# Patient Record
Sex: MALE | Employment: OTHER | URBAN - METROPOLITAN AREA
[De-identification: names, ages, dates, MRNs, and addresses within clinical notes are randomized per-mention and may not be internally consistent; named-entity substitution may affect disease eponyms.]

---

## 2017-01-05 ENCOUNTER — HOSPITAL ENCOUNTER (OUTPATIENT)
Dept: PHYSICAL THERAPY | Age: 62
Discharge: HOME | End: 2017-01-05
Payer: OTHER GOVERNMENT

## 2017-01-05 PROCEDURE — 97110 THERAPEUTIC EXERCISES: CPT

## 2017-01-05 PROCEDURE — 97140 MANUAL THERAPY 1/> REGIONS: CPT

## 2017-01-05 NOTE — PROGRESS NOTES
PF DAILY TREATMENT NOTE 3-16    Patient Name: Los Wyatt  Date:2017  : 1955  [x]  Patient  Verified  Payor:  / Plan: [facility]  RETIREES AND DEPENDENTS / Product Type: Nazanin Shames /    In time:1105  Out time:1200  Total Treatment Time (min): 55  Total Timed Codes (min): 55  1:1 Treatment Time ( [address] only):     Visit #: 2 of 8    Treatment Area: [] Pelvic Floor     [] Other:    SUBJECTIVE  Pain Level (0-10 scale): 4/10  Any medication changes, allergies to medications, adverse drug reactions, diagnosis change, or new procedure performed?: [x] No    [] Yes (see summary sheet for update)  Subjective functional status/changes:   [] No changes reported  Pt reports that he wakes about 2-3 times a night. He states that he has had pelvic pain, feeling it in his testicle(s) and post ejaculation. OBJECTIVE      30 min Therapeutic Exercise:  [x] See flow sheet :   []  Pelvic floor strengthening                 []  Pelvic floor downtraining  []  Quality pelvic floor contractions       [x]  Relaxation techniques  []  Urge suppression exercises  [x]  Other:added groin stretches  Rationale: increase ROM and increase strength  to improve the patients ability to complete functional activities without pain       15 min Manual Therapy:  MET to correct left posterior innominate rotation   Rationale: decrease pain and increase ROM to complete          With   [] TE   [] TA   [] neuro  [] manual   [] other: Patient Education: [x] Review HEP    [] Progressed/Changed HEP based on:   [] positioning   [] body mechanics   [] transfers   [] heat/ice application    [] other:    Pain Level (0-10 scale) post treatment: 0/10    ASSESSMENT/Changes in Function: Pt demonstrates limited hip mobility contributing to reported pelvic pain. Pt additionally demonstrated a hip obliquity contributing to pelvic floor dysfunction.   Pt would benefit from addition of pelvic pain goals    []  Decrease # of leaks   [] No change [] Improving [] Resolved     []  Decrease hypertonus [] No change []  Improving [] Resolved     []  Increase void interval [] No change []  Improving [] Resolved     []  Increase PF strength [] No change []  Improving [] Resolved     []  Increase PF endurance [] No change []  Improving [] Resolved     []  Increase endurance [] No change []  Improving [] Resolved     []  Decrease # of pads [] No change []  Improving [] Resolved     [x]  Decrease pain [] No change [x]  Improving [] Resolved     []  Increased coordination [] No change []  Improving [] Resolved     []  Increased Bowel Frequency [] No change []  Improving [] Resolved       Patient will continue to benefit from skilled PT services to address ROM deficits, address strength deficits and analyze and address soft tissue restrictions to attain remaining goals. []  See Plan of Care  []  See progress note/recertification  []  See Discharge Summary         Progress towards goals / Updated goals:  Short Term Goals: To be accomplished in 4 weeks:  1. Patient will demonstrate accurate performance of home exercise program/pelvic floor contractions as adjunct to physical therapy clinic visits to promote healthy lifestyle and increased quality of life. Status at Eval:Instructed in HEP to address deficits  Current: pt reports compliance  2. Patient will Complete Bladder Diary for use in patient education and goal setting as indicated. Status at Eval: reports urinary frequency when sleep after work shift (see above)  3. Patient will have sleep time void frequency decreased to <2-3x for increased quality of life. Status at Eval:3-5x/night follow shift work  Current: Slight improvement, 3-4 times a night currently     Long Term Goals: To be accomplished in 8 weeks   1. Patient will demonstrate independence in pelvic floor home exercise program for maintenance of pelvic floor program and increased quality of life. Status at Eval:pt given Handout for use in HEP  2. Patient will have sleep time void frequency decreased to <1x for improved sleep and increased quality of life. Status at Eval:3-5x while sleeping  Current: Progressing, see above  3. Patient will have FOTO score Urinary Problem change 8 points or more indicating improvement in function. Status at Eval:FOTO score Urinary Problem 86  Current:  Will assess at next visit    PLAN  []  Upgrade activities as tolerated     []  Continue plan of care  []  Update interventions per flow sheet       []  Discharge due to:_  []  Other:_      Hernán Waggoner PTA 1/5/2017  1:34 PM    Future Appointments  Date Time Provider Luis Antonio Angelo   1/9/2017 8:30 AM Izabella Vang, PT [facility]   1/16/2017 8:00 AM Izabella Vang, PT [facility]

## 2017-01-16 ENCOUNTER — HOSPITAL ENCOUNTER (OUTPATIENT)
Dept: PHYSICAL THERAPY | Age: 62
Discharge: HOME | End: 2017-01-16
Payer: OTHER GOVERNMENT

## 2017-01-16 PROCEDURE — 97140 MANUAL THERAPY 1/> REGIONS: CPT

## 2017-01-16 PROCEDURE — 97164 PT RE-EVAL EST PLAN CARE: CPT

## 2017-01-16 PROCEDURE — 97110 THERAPEUTIC EXERCISES: CPT

## 2017-01-16 NOTE — PROGRESS NOTES
[facility]  [address] [place], [address] [facility]  Ph [phone]  Fx [phone]    Pelvic Floor Progress Note  Patient name: Dakota Espana Start of Care: 16   Referral source: Kole Monreal MD : 1955   Medical/Treatment Diagnosis: Neuromuscular dysfunction of bladder, unspecified [N31.9] Onset Date:     Prior Hospitalization: see medical history Provider#: [id number]   Medications: Verified on Patient Summary List    Comorbidities: Pacemaker, Mitral valve repair Oct 2014, GERD, Vasectomy  (tubes removed in )  Prior Level of Function:h/o prostatitis after Vasectomy, but resolved following tube removal in   Visits from Start of Care: 3    Missed Visits: 1    Established Goals:           Excellent Good         Limited           None  [] Increase Pelvic MM strength       []  []  []  []  [] Decrease Pelvic MM hypertonus     []  []  []  []  [] Decrease Incontinence Episodes    []  []  []  []   [x] Improve Voiding Habits        []  [x]  []  []   [] Decreased Urgency        []  []  []  []  [] Decrease Pelvic Pain        []  []  []  []    Key Functional Changes: decrease in frequency when sleeping decreased from 3-5x/night to 1-3x/night  Progress towards goals / Updated goals:  Short Term Goals: To be accomplished in 4 weeks:  1. Patient will demonstrate accurate performance of home exercise program/pelvic floor contractions as adjunct to physical therapy clinic visits to promote healthy lifestyle and increased quality of life. Status at Eval:Instructed in HEP to address deficits  Current: Met demonstrates accuracy and compliance  2. Patient will Complete Bladder Diary for use in patient education and goal setting as indicated. Status at Eval: reports urinary frequency when sleep after work shift (see above)  Current status: Pt has completed same before UDS and will complete same again this week  3.  Patient will have sleep time void frequency decreased to <2-3x for increased quality of life. Status at Eval:3-5x/night follow shift work  Current:  Met at 1-3x/night now  [address] Term Goals: To be accomplished in 8 weeks   1. Patient will demonstrate independence in pelvic floor home exercise program for maintenance of pelvic floor program and increased quality of life. Status at Eval:pt given Handout for use in HEP  Current Status: progressing with independence  2. Patient will have sleep time void frequency decreased to <1x for improved sleep and increased quality of life. Status at Eval:3-5x while sleeping  Current: Progressing, see above  3. Patient will have FOTO score Urinary Problem change 8 points or more indicating improvement in function. Status at Eval:FOTO score Urinary Problem 86  Current: Met: FOTO Urinary Problem score 98 for 12 points improvement    Updated Goals: to be achieved in 4 weeks:  1. Patient will report worst scrotal pain 1-2/10 or less for improved quality of life and more normal function. 2.  Patient will Complete Bladder Diary for use in patient education and goal setting as indicated. 3.  Patient will demonstrate independence in pelvic floor home exercise program for maintenance of pelvic floor program and increased quality of life. 4.  Patient will have sleep time void frequency decreased to 1-2 x for improved sleep and increased quality of life. ASSESSMENT/RECOMMENDATIONS: Pt reports h/o scrotal pain, not initially reported at evaluation. Pt reports pain scrotal pain since 2001 following a vasectomy. States tubes were getting inflamed and were subsequently removed in 2004. Pain decreased overall afterward \"tube removal\", but would occur after intercourse and pt has been abstinent for some time now due to same. Pt now reports same pain present, though not as intense. At worst in last 3 wks at worst 3-4/10 and at best 1/10.   Pt has also demonstrated limited hip mobility contributing to reported pelvic pain, as well as right pelvic obliquity contributing to pelvic floor dysfunction. Pt would benefit from addition of pelvic floor physical therapy to address pain at this time. Urinary symptoms have improved as noted above. [x]Continue therapy per initial plan/protocol at a frequency of  1 x per week for 4 weeks  []Continue therapy with the following recommended changes:_____________________      _____________________________________________________________________  []Discontinue therapy progressing towards or have reached established goals  []Discontinue therapy due to lack of appreciable progress towards goals  []Discontinue therapy due to lack of attendance or compliance  []Await Physician's recommendations/decisions regarding therapy  []Other:________________________________________________________________    Thank you for this referral.   Phyllis Davis, PT 1/16/2017 8:10 AM  NOTE TO PHYSICIAN:  PLEASE COMPLETE THE ORDERS BELOW AND   FAX TO [facility]: [phone]  If you are unable to process this request in 24 hours please contact our office: [phone]      ? I have read the above report and request that my patient continue as recommended. ? I have read the above report and request that my patient continue therapy with the following changes/special instructions:___________________________________________________________  ? I have read the above report and request that my patient be discharged from therapy.     Physicians signature: ________________________________Date: _____Time:_____

## 2017-01-16 NOTE — PROGRESS NOTES
PF DAILY TREATMENT NOTE 3-16    Patient Name: Mendoza Kaminski  Date:2017  : 1955  [x]  Patient  Verified  Payor:  / Plan: [facility]  RETIREES AND DEPENDENTS / Product Type: Kailey Stack /    In time:8:06  Out time:8:53  Total Treatment Time (min):47  Visit #: 3 of 8    Treatment Area: [x] Pelvic Floor     [] Other:    SUBJECTIVE  Pain Level (0-10 scale): 1/10 in scrotum  Any medication changes, allergies to medications, adverse drug reactions, diagnosis change, or new procedure performed?: [x] No    [] Yes (see summary sheet for update)  Subjective functional status/changes:   [] No changes reported  Doing better, waking up only 2-3 times a night and did not wake up at all last night. OBJECTIVE      25 min Therapeutic Exercise:  [] See flow sheet :   []  Pelvic floor strengthening                 []  Pelvic floor downtraining  []  Quality pelvic floor contractions       [x]  Relaxation techniques  []  Urge suppression exercises  [x]  Other: and demonstrates accurate self MET  Rationale: increase ROM and decrease pain  to improve the patients ability to perform functional activity without pain       10 min Manual Therapy:  DPM bilat superior hip adductor   Rationale: decrease pain and increase tissue extensibility to decrease scrotal     With   [] TE   [] TA   [] neuro  [] manual   [] other: Patient Education: [x] Review HEP    [] Progressed/Changed HEP based on:   [] positioning   [] body mechanics   [] transfers   [] heat/ice application    [] other:      Other Objective/Functional Measures: Re-Eval: palp: TTP of bilat hip adductors more superiorly.    Pelvic alignment:   []baseline resting tone:   []slow twitch mms   []fast twitch mms    Pain Level (0-10 scale) post treatment: 3/10    ASSESSMENT/Changes in Function: See POC    []  Decrease # of leaks   [] No change []  Improving [] Resolved     []  Decrease hypertonus [] No change []  Improving [] Resolved     [x]  Increase void interval [] No change [x]  Improving [] Resolved     []  Increase PF strength [] No change []  Improving [] Resolved     []  Increase PF endurance [] No change []  Improving [] Resolved     []  Increase endurance [] No change []  Improving [] Resolved     []  Decrease # of pads [] No change []  Improving [] Resolved     [x]  Decrease pain [] No change [x]  Improving [] Resolved     []  Increased coordination [] No change []  Improving [] Resolved     []  Increased Bowel Frequency [] No change []  Improving [] Resolved       Patient will continue to benefit from skilled PT services to modify and progress therapeutic interventions, address ROM deficits and analyze and address soft tissue restrictions to attain remaining goals. []  See Plan of Care  [x]  See progress note/recertification  []  See Discharge Summary    Progress towards goals/Updated goals:      Short Term Goals: To be accomplished in 4 weeks:  1. Patient will demonstrate accurate performance of home exercise program/pelvic floor contractions as adjunct to physical therapy clinic visits to promote healthy lifestyle and increased quality of life. Status at Eval:Instructed in HEP to address deficits  Current: Met demonstrates accuracy and compliance  2. Patient will Complete Bladder Diary for use in patient education and goal setting as indicated. Status at Eval: reports urinary frequency when sleep after work shift (see above)  Current status: Pt has completed same before UDS and will complete same again this week  3. Patient will have sleep time void frequency decreased to <2-3x for increased quality of life. Status at Eval:3-5x/night follow shift work  Current: Met at 1-3x/night now  [address] Term Goals: To be accomplished in 8 weeks   1. Patient will demonstrate independence in pelvic floor home exercise program for maintenance of pelvic floor program and increased quality of life.    Status at Eval:pt given Handout for use in HEP  Current Status: progressing with independence  2. Patient will have sleep time void frequency decreased to <1x for improved sleep and increased quality of life. Status at Eval:3-5x while sleeping  Current: Progressing, see above  3. Patient will have FOTO score Urinary Problem change 8 points or more indicating improvement in function. Status at Eval:FOTO score Urinary Problem 86  Current: Met: FOTO Urinary Problem score 98 for 12 points improvement       PLAN  []  Upgrade activities as tolerated     [x]  Continue plan of care  []  Update interventions per flow sheet       []  Discharge due to:_  []  Other:_      Bautitsa Klein, PT 1/16/2017  8:06 AM    No future appointments.

## 2017-01-23 ENCOUNTER — APPOINTMENT (OUTPATIENT)
Dept: PHYSICAL THERAPY | Age: 62
End: 2017-01-23
Payer: OTHER GOVERNMENT

## 2017-02-03 ENCOUNTER — APPOINTMENT (OUTPATIENT)
Dept: PHYSICAL THERAPY | Age: 62
End: 2017-02-03
Payer: OTHER GOVERNMENT

## 2017-02-10 ENCOUNTER — HOSPITAL ENCOUNTER (OUTPATIENT)
Dept: PHYSICAL THERAPY | Age: 62
Discharge: HOME | End: 2017-02-10
Payer: OTHER GOVERNMENT

## 2017-02-10 PROCEDURE — 97110 THERAPEUTIC EXERCISES: CPT

## 2017-02-10 NOTE — PROGRESS NOTES
PF DAILY TREATMENT NOTE 3-16    Patient Name: Catracho Corcoran  Date:2/10/2017  : 1955  [x]  Patient  Verified  Payor:  / Plan: [facility]  RETIREES AND DEPENDENTS / Product Type: Darleen Balloon /    In time:900  Out time:930  Total Treatment Time (min): 30  Total Timed Codes (min): 30  1:1 Treatment Time ( [address] only):     Visit #: 4 of 8    Treatment Area: [] Pelvic Floor     [] Other:    SUBJECTIVE  Pain Level (0-10 scale): 0/10  Any medication changes, allergies to medications, adverse drug reactions, diagnosis change, or new procedure performed?: [x] No    [] Yes (see summary sheet for update)  Subjective functional status/changes:   [] No changes reported  Pt reports that he has been sleeping better waking about 2-3 times a night. He states that his pain has been better if he is consistent with his HEP but it is higher when he is with his fiancee as they are abstaining until marriage. OBJECTIVE             30 min Therapeutic Exercise:  [x] See flow sheet :   []  Pelvic floor strengthening                 [x]  Pelvic floor downtraining  []  Quality pelvic floor contractions       [x]  Relaxation techniques  []  Urge suppression exercises  [x]  Other: hip stretches  Rationale: increase ROM  to improve the patients ability to complete functional activities absent of pain               With   [x] TE   [] TA   [] neuro  [] manual   [] other: Patient Education: [x] Review HEP    [x] Progressed/Changed HEP based on:hip stretches   [] positioning   [] body mechanics   [] transfers   [] heat/ice application    [] other:        Pain Level (0-10 scale) post treatment: 0/10    ASSESSMENT/Changes in Function: Pt demonstrates very limited mobility into hamstrings, adductors and ITB bilaterally likely contributing to ongoing intermittent pain.  Pt returned demonstration of HEP to promote PF relaxation    []  Decrease # of leaks   [] No change []  Improving [] Resolved     [x]  Decrease hypertonus [] No change [x] Improving [] Resolved     []  Increase void interval [] No change []  Improving [] Resolved     []  Increase PF strength [] No change []  Improving [] Resolved     []  Increase PF endurance [] No change []  Improving [] Resolved     []  Increase endurance [] No change []  Improving [] Resolved     []  Decrease # of pads [] No change []  Improving [] Resolved     [x]  Decrease pain [] No change [x]  Improving [] Resolved     []  Increased coordination [] No change []  Improving [] Resolved     []  Increased Bowel Frequency [] No change []  Improving [] Resolved       Patient will continue to benefit from skilled PT services to address functional mobility deficits, address ROM deficits, address strength deficits and analyze and address soft tissue restrictions to attain remaining goals. []  See Plan of Care  []  See progress note/recertification  []  See Discharge Summary         Progress towards goals / Updated goals:  Short Term Goals: To be accomplished in 4 weeks:  1. Patient will demonstrate accurate performance of home exercise program/pelvic floor contractions as adjunct to physical therapy clinic visits to promote healthy lifestyle and increased quality of life. Status at Eval:Instructed in HEP to address deficits  Current: Met demonstrates accuracy and compliance  2. Patient will Complete Bladder Diary for use in patient education and goal setting as indicated. Status at Eval: reports urinary frequency when sleep after work shift (see above)  Current status: Completed and reviewed with patient  3. Patient will have sleep time void frequency decreased to <2-3x for increased quality of life. Status at Eval:3-5x/night follow shift work  Current: Met at 1-3x/night now  [address] [facility] Term Goals: To be accomplished in 8 weeks   1. Patient will demonstrate independence in pelvic floor home exercise program for maintenance of pelvic floor program and increased quality of life.    Status at Eval:pt given Handout for use in HEP  Current Status: progressing with independence  2. Patient will have sleep time void frequency decreased to <1x for improved sleep and increased quality of life. Status at Eval:3-5x while sleeping  Current: Progressing, 2-3 times a night  3. Patient will have FOTO score Urinary Problem change 8 points or more indicating improvement in function. Status at Eval:FOTO score Urinary Problem 86  Current: Met: FOTO Urinary Problem score 98 for 12 points improvement     Updated Goals: to be achieved in 4 weeks:  1. Patient will report worst scrotal pain 1-2/10 or less for improved quality of life and more normal function. Current: pt reports 5-6/10 for pain intermittently but better when he stretches regularly. 2. Patient will Complete Bladder Diary for use in patient education and goal setting as indicated. Current: Met  3. Patient will demonstrate independence in pelvic floor home exercise program for maintenance of pelvic floor program and increased quality of life. Current: Improving  4. Patient will have sleep time void frequency decreased to 1-2 x for improved sleep and increased quality of life.    Current: wakes 2-3 times a night, which has been better       PLAN  []  Upgrade activities as tolerated     []  Continue plan of care  []  Update interventions per flow sheet       []  Discharge due to:_  []  Other:_      Mike Greer PTA 2/10/2017  9:21 AM    Future Appointments  Date Time Provider Luis Antonio Angelo   2/13/2017 8:30 AM Bautista Klein PT [facility]

## 2017-02-13 ENCOUNTER — HOSPITAL ENCOUNTER (OUTPATIENT)
Dept: PHYSICAL THERAPY | Age: 62
Discharge: HOME | End: 2017-02-13
Payer: OTHER GOVERNMENT

## 2017-02-13 PROCEDURE — 97530 THERAPEUTIC ACTIVITIES: CPT

## 2017-02-13 PROCEDURE — 97110 THERAPEUTIC EXERCISES: CPT

## 2017-02-13 NOTE — PROGRESS NOTES
PF DAILY TREATMENT NOTE 3-16    Patient Name: Andria Aldana  Date:2017  : 1955  [x]  Patient  Verified  Payor:  / Plan: [facility]  RETIREES AND DEPENDENTS / Product Type: Magui Barrs /    In time:8:35  Out time:925  Total Treatment Time (min): 50  Visit #: 5of 8    Treatment Area: [x] Pelvic Floor     [] Other:    SUBJECTIVE  Pain Level (0-10 scale): 1-2/10  Any medication changes, allergies to medications, adverse drug reactions, diagnosis change, or new procedure performed?: [x] No    [] Yes (see summary sheet for update)  Subjective functional status/changes:   [] No changes reported  Key Functional Changes: decrease in scrotal pain. Pt states sleep is the most important thing for him at this point and decrease of pain. States that is why he is retiring in April.       OBJECTIVE    20 min Therapeutic Exercise:  [x] See flow sheet :   []  Pelvic floor strengthening                 [x]  Pelvic floor downtraining  []  Quality pelvic floor contractions       [x]  Relaxation techniques  []  Urge suppression exercises  []  Other:  Rationale: increase ROM  to improve the patients ability to to complete functional activities without pain      20 min Therapeutic Activity:  []  See flow sheet :    []  Increase Tissue extensibility        []  Assess fiber intake    [x]  Assess voiding habits  []  Assess bowel habits  []  Other:   Rationale: determine habits for intake/voiding and possible effect on voiding function to improve the patients ability to void without urinary frequency when sleeping          With   [] TE   [] TA   [] neuro  [] manual   [] other: Patient Education: [x] Review HEP    [] Progressed/Changed HEP based on:   [] positioning   [] body mechanics   [] transfers   [] heat/ice application    [] other:      Other Objective/Functional Measures: Hamstring flexibility R:23; L 13       []baseline resting tone:   []slow twitch mms   []fast twitch mms    Pain Level (0-10 scale) post treatment: 1-2/10    ASSESSMENT/Changes in Function: pt with improvement in pain and urinary symptoms. May continue to progress otherwise as pt becomes more compliant consistently with behavior modifications with regard to fluid intake when working. Exercises beneficial.  Pt unable to address pain following intercourse at this time as he and fiance are abstaining until marriage in [de-identified]. Recommend d/c to home program at this time     []  Decrease # of leaks   [] No change []  Improving [] Resolved     []  Decrease hypertonus [] No change []  Improving [] Resolved     []  Increase void interval [] No change []  Improving [] Resolved     []  Increase PF strength [] No change []  Improving [] Resolved     []  Increase PF endurance [] No change []  Improving [] Resolved     []  Increase endurance [] No change []  Improving [] Resolved     []  Decrease # of pads [] No change []  Improving [] Resolved     []  Decrease pain [] No change []  Improving [] Resolved     []  Increased coordination [] No change []  Improving [] Resolved     []  Increased Bowel Frequency [] No change []  Improving [] Resolved       []  See Plan of Care  []  See progress note/recertification  [x]  See Discharge Summary         Progress towards goals / Updated goals:  1. Patient will report worst scrotal pain 1-2/10 or less for improved quality of life and more normal function. Current: MET pt reports worst pain 1-2/10 for pain intermittently but better when he stretches regularly. 2. Patient will Complete Bladder Diary for use in patient education and goal setting as indicated. Current: Met  3. Patient will demonstrate independence in pelvic floor home exercise program for maintenance of pelvic floor program and increased quality of life. Current:   4. Patient will have sleep time void frequency decreased to 1-2 x for improved sleep and increased quality of life.    Current: Met Progressed-wakes 2-3 times a night, which has been better    PLAN  []  Upgrade activities as tolerated     []  Continue plan of care  []  Update interventions per flow sheet       [x]  Discharge due to:HEP and pt to f/u with Dr. Fahad Seaman (has to make appointment). []  Other:_      Rossana Mccormick, PT 2/13/2017  8:51 AM    No future appointments.

## 2017-02-13 NOTE — PROGRESS NOTES
[facility]  [address], [address] [facility] [place]  Ph [phone]  Fx [phone]    Pelvic Floor Progress Note  Patient name: Fernando Elmore Start of Care: 12/15/16   Referral source: Joan Canas MD : [id number]   Medical/Treatment Diagnosis: Neuromuscular dysfunction of bladder, unspecified [N31.9] Onset Date:     Prior Hospitalization: see medical history Provider#: [id number]   Medications: Verified on Patient Summary List    Comorbidities: Pacemaker, Mitral valve repair Oct 2014, GERD, Vasectomy  (tubes removed in )  Prior Level of Function:h/o prostatitis after Vasectomy, but resolved following tube removal in     Visits from Start of Care: 5   Missed Visits:     Established Goals:           Excellent Good         Limited           None  [] Increase Pelvic MM strength       []  []  []  []  [] Decrease Pelvic MM hypertonus     []  []  []  []  [] Decrease Incontinence Episodes    []  []  []  []   [x] Improve Voiding Habits        []  [x]  []  []   [] Decreased Urgency        []  []  []  []  [x] Decrease Pelvic Pain        []  [x]  []  []    Key Functional Changes: decrease in scrotal pain  Updated Goals: to be achieved in 4 weeks:  1. Patient will report worst scrotal pain 1-2/10 or less for improved quality of life and more normal function. Current: MET pt reports worst pain 1-2/10 for pain intermittently but better when he stretches regularly. 2. Patient will Complete Bladder Diary for use in patient education and goal setting as indicated. Current: Met  3. Patient will demonstrate independence in pelvic floor home exercise program for maintenance of pelvic floor program and increased quality of life. Current: Improving  4. Patient will have sleep time void frequency decreased to 1-2 x for improved sleep and increased quality of life.    Current: Progressed-wakes 2-3 times a night, which has been better     ASSESSMENT/RECOMMENDATIONS: Pt with improvement in pain and urinary symptoms. Pt reports feeling 30% improvement overall. States scrotal pain associated with vasectomy has been present since the 90's and following intercourse since the 2000's. Pain now at best 0/10, at worst 1-2/10. Pt unable to address pain following intercourse at this time as he and fiance are abstaining until marriage in [de-identified]. Pt states BLE stretches (hamstrings/hip adductors and ITB) have been helpful. May continue to progress otherwise with regard to urinary symptoms as pt becomes more compliant consistently with behavior modifications (fluid intake when working). Pt has noted ability to sleep with 1 episode of nocturnal voiding when off work. At this time recommend:    []Continue therapy per initial plan/protocol at a frequency of   x per week for  weeks  []Continue therapy with the following recommended changes:_____________________      _____________________________________________________________________  [x]Discontinue therapy progressing towards or have reached established goals  []Discontinue therapy due to lack of appreciable progress towards goals  []Discontinue therapy due to lack of attendance or compliance  []Await Physician's recommendations/decisions regarding therapy  []Other:________________________________________________________________    Thank you for this referral.   Ta Manrique, PT 2/13/2017 8:36 AM  NOTE TO PHYSICIAN:  PLEASE COMPLETE THE ORDERS BELOW AND   FAX TO [facility]: [phone]  If you are unable to process this request in 24 hours please contact our office: [phone]      ? I have read the above report and request that my patient continue as recommended. ? I have read the above report and request that my patient continue therapy with the following changes/special instructions:___________________________________________________________  ?  I have read the above report and request that my patient be discharged from therapy.     Physicians signature: ________________________________Date: _____Time:_____